# Patient Record
Sex: FEMALE | Race: OTHER | HISPANIC OR LATINO | URBAN - METROPOLITAN AREA
[De-identification: names, ages, dates, MRNs, and addresses within clinical notes are randomized per-mention and may not be internally consistent; named-entity substitution may affect disease eponyms.]

---

## 2017-05-04 ENCOUNTER — EMERGENCY (EMERGENCY)
Facility: HOSPITAL | Age: 77
LOS: 1 days | Discharge: HOME | End: 2017-05-04
Attending: EMERGENCY MEDICINE | Admitting: EMERGENCY MEDICINE
Payer: MEDICAID

## 2017-05-04 VITALS
RESPIRATION RATE: 18 BRPM | HEART RATE: 68 BPM | DIASTOLIC BLOOD PRESSURE: 55 MMHG | TEMPERATURE: 99 F | OXYGEN SATURATION: 98 % | SYSTOLIC BLOOD PRESSURE: 131 MMHG

## 2017-05-04 VITALS
SYSTOLIC BLOOD PRESSURE: 127 MMHG | RESPIRATION RATE: 17 BRPM | OXYGEN SATURATION: 99 % | HEART RATE: 69 BPM | DIASTOLIC BLOOD PRESSURE: 58 MMHG

## 2017-05-04 DIAGNOSIS — M25.561 PAIN IN RIGHT KNEE: ICD-10-CM

## 2017-05-04 DIAGNOSIS — Z79.899 OTHER LONG TERM (CURRENT) DRUG THERAPY: ICD-10-CM

## 2017-05-04 DIAGNOSIS — E11.9 TYPE 2 DIABETES MELLITUS WITHOUT COMPLICATIONS: ICD-10-CM

## 2017-05-04 DIAGNOSIS — Z79.84 LONG TERM (CURRENT) USE OF ORAL HYPOGLYCEMIC DRUGS: ICD-10-CM

## 2017-05-04 PROCEDURE — 99284 EMERGENCY DEPT VISIT MOD MDM: CPT | Mod: 25

## 2017-05-04 PROCEDURE — 93971 EXTREMITY STUDY: CPT

## 2017-05-04 PROCEDURE — 93971 EXTREMITY STUDY: CPT | Mod: 26

## 2017-05-04 RX ORDER — METFORMIN HYDROCHLORIDE 850 MG/1
0 TABLET ORAL
Qty: 0 | Refills: 0 | COMMUNITY

## 2017-05-04 RX ORDER — IBUPROFEN 200 MG
600 TABLET ORAL ONCE
Qty: 0 | Refills: 0 | Status: COMPLETED | OUTPATIENT
Start: 2017-05-04 | End: 2017-05-04

## 2017-05-04 RX ADMIN — Medication 600 MILLIGRAM(S): at 15:41

## 2017-05-04 NOTE — ED PROVIDER NOTE - PLAN OF CARE
Use Tylenol 650mg and/or Motrin 600mg every 6 hours as needed for pain.  Follow-up with orthopedic surgery as needed tomorrow.  Return for any new/worsening symptoms or concerns.

## 2017-05-04 NOTE — ED PROVIDER NOTE - NEUROLOGICAL, MLM
Alert and oriented, no focal deficits, no motor or sensory deficits. Alert and oriented, no focal deficits, no motor or sensory deficits.  Ambulates without assistance wnl.

## 2017-05-04 NOTE — ED PROVIDER NOTE - OBJECTIVE STATEMENT
76F hx Diabetes on metformin presents with R knee pain worsening over the past few days.  Pt states that she has intermittently experienced b/l knee pain, however, R is now worse.  No trauma.  Pain worsens over the course of the day.  Some response to Tylenol.  Denies fever/chills, skin changes.  Pt has appointment with orthopedic surgeon tomorrow and only presented to ED because  was being evaluated for illness.  No hx immobilization or clots.

## 2017-05-04 NOTE — ED PROVIDER NOTE - PHYSICAL EXAMINATION
Attending Richard: agree with below. Gen: NAD, heent: atrauamtic, eomi, perrla, mmm, op pink, uvula midline, neck; nttp, no nuchal rigidity, chest: nttp, no crepitus, cv: rrr, no murmurs, lungs: ctab, abd: soft, nontender, nondistended, no peritoneal signs, +BS, no guarding, ext: wwp, mild ttp R lateral knee, rull rom, no warmth or redness. able to range. mild ttp left gastro, negative homans, skin: no rash, neuro: awake and alert, following commands, speech clear, sensation and strength intact, no focal deficits

## 2017-05-04 NOTE — ED PROVIDER NOTE - MUSCULOSKELETAL, MLM
Spine appears normal, range of motion is not limited, no muscle or joint tenderness Spine appears normal, mild R knee swelling with mild lateral ttp, full ROM at R knee. mild L gastrocnemius ttp.  No other joint or muscle pain.

## 2017-05-04 NOTE — ED PROVIDER NOTE - PROGRESS NOTE DETAILS
patient ambulating without difficulty, no hx trauma.  pt to follow-up with orthopedic surgery.  clinically does not warrant xray at this time.

## 2017-05-04 NOTE — ED ADULT NURSE NOTE - OBJECTIVE STATEMENT
Per pt's family member's translation, pt is having right knee pain that has been ongoing for a while.  Pt has not had any falls or injury to the knee, pt also denies fever, chills, sob, dizziness, lightheadedness, or swelling.  Pt has an appointment with an orthopedic surgeon tomorrow but was in the dept with a family member and decided to get checked out.  Pt is able to ambulate with steady gait using her cane.

## 2017-05-04 NOTE — ED PROVIDER NOTE - CARE PLAN
Principal Discharge DX:	Knee pain  Instructions for follow-up, activity and diet:	Use Tylenol 650mg and/or Motrin 600mg every 6 hours as needed for pain.  Follow-up with orthopedic surgery as needed tomorrow.  Return for any new/worsening symptoms or concerns.

## 2017-05-04 NOTE — ED ADULT NURSE NOTE - CHPI ED SYMPTOMS NEG
no nausea/no vomiting/no weakness/no numbness/no dizziness/no tingling/no fever/no decreased eating/drinking/no chills

## 2017-05-04 NOTE — ED PROVIDER NOTE - MEDICAL DECISION MAKING DETAILS
Non-traumatic knee pain likely arthritis. eval for DVT with US. xray, analgesia.  likely d/c with ortho fu tomrrow Non-traumatic knee pain likely arthritis. eval for DVT with US. xray, analgesia.  likely d/c with ortho fu tompatrice  Attending Richard: 77 y/o female presenting with atraumatic knee pain. able to ambulate. no redness or warmth to suggest septic joint and pt able to range. well appearing. negative homans, and pocus shows no evidence of right sided proximal dvt. pt has appt with ortho. will recommend supportive care. likey arthritis and close return precautions. pt with stable gait

## 2020-06-26 NOTE — ED PROVIDER NOTE - CARDIAC, MLM
COVID-19 PCR test completed. Patient handout For Patients Who Have Been Tested for Covid-19 (Coronavirus) was given to the patient, which includes test result notification process.    
2+ Dp pulses b/l